# Patient Record
Sex: MALE | Race: ASIAN | NOT HISPANIC OR LATINO | URBAN - METROPOLITAN AREA
[De-identification: names, ages, dates, MRNs, and addresses within clinical notes are randomized per-mention and may not be internally consistent; named-entity substitution may affect disease eponyms.]

---

## 2018-12-14 ENCOUNTER — OFFICE VISIT (OUTPATIENT)
Dept: FAMILY MEDICINE | Facility: CLINIC | Age: 38
End: 2018-12-14
Payer: COMMERCIAL

## 2018-12-14 VITALS
SYSTOLIC BLOOD PRESSURE: 124 MMHG | OXYGEN SATURATION: 97 % | WEIGHT: 174 LBS | HEIGHT: 65 IN | RESPIRATION RATE: 20 BRPM | DIASTOLIC BLOOD PRESSURE: 79 MMHG | TEMPERATURE: 98.2 F | BODY MASS INDEX: 28.99 KG/M2 | HEART RATE: 83 BPM

## 2018-12-14 DIAGNOSIS — G47.9 SLEEP DISORDER: Primary | ICD-10-CM

## 2018-12-14 RX ORDER — ALBUTEROL SULFATE 90 UG/1
2 AEROSOL, METERED RESPIRATORY (INHALATION) EVERY 6 HOURS
COMMUNITY

## 2018-12-14 RX ORDER — LANOLIN ALCOHOL/MO/W.PET/CERES
3 CREAM (GRAM) TOPICAL
COMMUNITY
Start: 2018-12-14 | End: 2019-12-14

## 2018-12-14 RX ORDER — DIPHENHYDRAMINE HCL 25 MG
25 CAPSULE ORAL EVERY 6 HOURS PRN
COMMUNITY
Start: 2018-12-14 | End: 2019-12-14

## 2018-12-14 ASSESSMENT — MIFFLIN-ST. JEOR: SCORE: 1627.17

## 2018-12-14 ASSESSMENT — ANXIETY QUESTIONNAIRES
6. BECOMING EASILY ANNOYED OR IRRITABLE: NOT AT ALL
5. BEING SO RESTLESS THAT IT IS HARD TO SIT STILL: NOT AT ALL
GAD7 TOTAL SCORE: 2
7. FEELING AFRAID AS IF SOMETHING AWFUL MIGHT HAPPEN: NOT AT ALL
1. FEELING NERVOUS, ANXIOUS, OR ON EDGE: MORE THAN HALF THE DAYS
IF YOU CHECKED OFF ANY PROBLEMS ON THIS QUESTIONNAIRE, HOW DIFFICULT HAVE THESE PROBLEMS MADE IT FOR YOU TO DO YOUR WORK, TAKE CARE OF THINGS AT HOME, OR GET ALONG WITH OTHER PEOPLE: NOT DIFFICULT AT ALL
3. WORRYING TOO MUCH ABOUT DIFFERENT THINGS: NOT AT ALL
2. NOT BEING ABLE TO STOP OR CONTROL WORRYING: NOT AT ALL

## 2018-12-14 ASSESSMENT — PATIENT HEALTH QUESTIONNAIRE - PHQ9
SUM OF ALL RESPONSES TO PHQ QUESTIONS 1-9: 2
5. POOR APPETITE OR OVEREATING: NOT AT ALL

## 2018-12-14 NOTE — PATIENT INSTRUCTIONS
Improving Sleep Through Behavior Change                                                   Stimulus Control Procedures      Go to Bed Only When You Are Sleepy: The longer you are in bed, the more the bed is associated with a place to be awake instead of asleep. Delay bedtime until sleepy.     Get Out of Bed When You Can t Fall Asleep or Go Back to Sleep in About 15 Minutes: Get out of bed if you don t fall asleep fairly soon. Return to bed only when you are sleepy. When you feel sleepy, return to bed. The goal is to reconnect your bed with being asleep.    Use the Bed for Sleep and Sex Only: Do not watch TV, listen to the radio, eat, or read in your bed or bedroom.                                                       Sleep Hygiene Guidelines    Caffeine: Avoid caffeine 6 to 8 hours before bedtime. Caffeine disturbs sleep. Thus, drinking caffeinated beverages should be avoided near bedtime.      Nicotine: Avoid nicotine before bedtime. Nicotine can keep you awake. Avoid tobacco near bedtime and during the night.       Alcohol: Avoid alcohol after dinner. Alcohol often promotes the onset of sleep, but interrupts your natural sleep pattern. Do not consume it any closer than 4 hours before going to bed.       Sleeping Pills: Sleep medications are effective only temporarily. Sleep medications lose their effectiveness in about 2 to 4 weeks when taken regularly. Over time, sleeping pills actually can make sleep problems worse; withdrawal from the medication can lead to an insomnia rebound. Keep use of sleeping pills infrequent, but don t worry if you need to use one on an occasional basis.      Regular Exercise: Do not exercise within 2 hours of bedtime as it may elevate nervous system activity and interfere with your ability to fall asleep       Bedroom Environment: Your bedroom should have a moderate temperature and be quiet and dark. Noises can be masked with background white noise  (e.g., the noise of a fan) or with earplugs. Bedrooms may be darkened with blackout shades, or sleep masks can be worn.       Eating: A light bedtime snack, such a glass of warm milk, cheese, or a bowl of cereal can promote sleep. Avoid snacks in the middle of the night because awakening may become associated with hunger.       Avoid Naps: The sleep you obtain during the day takes away from the amount of sleep you need that night. If you must nap, schedule it before 3:00 p.m. Don t sleep more than 15 to 30 minutes.      Allow Yourself at Least an Hour Before Bedtime to Unwind: Find what works for you to wind down, and perhaps give yourself an hour to do so.       Regular Sleep Schedule: Keep a regular time each day, 7 days a week, to get out of bed. Keeping a regular waking time helps set your circadian rhythm so that your body learns to sleep at the desired time.      Set a Reasonable Bedtime and Arising Time and Stick to Them: Set the alarm clock and get out of bed at the same time each morning, weekdays and weekends, regardless of your bedtime or the amount of sleep you obtained on the previous night. This guideline is designed to regulate your internal biological clock and reset your sleep-wake rhythm.    DURGA Clifton., Tay, ALLEN FENTON., Samanthat, M. S., & HUMZA Mabry (2009). Integrated Behavioral [place] in Primary Care: Step-by-Step Guidance for Assessment and Intervention. [employer].     Melatonin - Take 1.5-3 mg 2 hours before you want to go to sleep  Benadryl - Take 25-50 mg or 1 - 2 capsules 30 - 45 min before bed

## 2018-12-14 NOTE — PROGRESS NOTES
Preceptor Attestation:   Patient seen, evaluated and discussed with the resident. I have verified the content of the note, which accurately reflects my assessment of the patient and the plan of care.   Supervising Physician:  Srini Burnett MD

## 2018-12-15 ASSESSMENT — ANXIETY QUESTIONNAIRES: GAD7 TOTAL SCORE: 2

## 2022-09-14 ENCOUNTER — TRANSFERRED RECORDS (OUTPATIENT)
Dept: HEALTH INFORMATION MANAGEMENT | Facility: CLINIC | Age: 42
End: 2022-09-14

## 2022-12-06 ENCOUNTER — ANCILLARY PROCEDURE (OUTPATIENT)
Dept: GENERAL RADIOLOGY | Facility: CLINIC | Age: 42
End: 2022-12-06
Attending: STUDENT IN AN ORGANIZED HEALTH CARE EDUCATION/TRAINING PROGRAM
Payer: COMMERCIAL

## 2022-12-06 ENCOUNTER — OFFICE VISIT (OUTPATIENT)
Dept: FAMILY MEDICINE | Facility: CLINIC | Age: 42
End: 2022-12-06
Payer: COMMERCIAL

## 2022-12-06 VITALS
SYSTOLIC BLOOD PRESSURE: 139 MMHG | HEART RATE: 72 BPM | RESPIRATION RATE: 18 BRPM | OXYGEN SATURATION: 100 % | DIASTOLIC BLOOD PRESSURE: 90 MMHG | WEIGHT: 176 LBS | BODY MASS INDEX: 29.32 KG/M2 | TEMPERATURE: 98.3 F | HEIGHT: 65 IN

## 2022-12-06 DIAGNOSIS — R52 PAIN: ICD-10-CM

## 2022-12-06 DIAGNOSIS — Z13.220 SCREENING FOR HYPERLIPIDEMIA: Primary | ICD-10-CM

## 2022-12-06 DIAGNOSIS — S93.402A SPRAIN OF LEFT ANKLE, UNSPECIFIED LIGAMENT, INITIAL ENCOUNTER: ICD-10-CM

## 2022-12-06 PROCEDURE — 73620 X-RAY EXAM OF FOOT: CPT | Mod: TC | Performed by: RADIOLOGY

## 2022-12-06 PROCEDURE — 99203 OFFICE O/P NEW LOW 30 MIN: CPT | Performed by: STUDENT IN AN ORGANIZED HEALTH CARE EDUCATION/TRAINING PROGRAM

## 2022-12-06 PROCEDURE — 73600 X-RAY EXAM OF ANKLE: CPT | Mod: TC | Performed by: RADIOLOGY

## 2022-12-06 RX ORDER — INDOMETHACIN 50 MG/1
50 CAPSULE ORAL 2 TIMES DAILY WITH MEALS
COMMUNITY

## 2022-12-06 NOTE — PROGRESS NOTES
Chief Complaint   Patient presents with     Ankle Sprain     Left ankle/foot sprain since last Thursday       There are no exam notes on file for this visit.        Assessment/Plan:  Kong Borja is a 42 year old male here for L foot and ankle pain with bruising, with persistent swelling, bruising and difficulty walking for the past 5 days.  Met [place] ankle criteria so X-ray imaging obtained to r/o fracture and no fracture seen on imaging. Patient instructed in RICE therapy, symptomatic cares, ankle stretching and strengthening and given a note for return to work.   - F/u for annual wellness exam  - F/u for ankle PRN    Kong was seen today for ankle sprain.    Diagnoses and all orders for this visit:    Screening for hyperlipidemia    Pain  -     XR Foot Left 2 Views; Future  -     XR Ankle Left 2 Views; Future    Sprain of left ankle, unspecified ligament, initial encounter        Follow-up with Behm, Benita Kay in 4 weeks for annual physical.    No future appointments.      Mindi Cary MD      There are no Patient Instructions on file for this visit.    Subjective:  Kong Borja is a 42 year old male here for left foot pain after twisting foot/awkward fall from treadmill. Had difficulty walking at the time of the accident and noted substantial bruising/swelling over the left foot and ankle. With pain over the dorsum of the left foot and on the medial aspect of the left malleolus. Able to limp today.     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     Patient Active Problem List   Diagnosis     Health Care Home       Current Outpatient Medications   Medication     albuterol (PROAIR HFA) 108 (90 BASE) MCG/ACT inhaler     indomethacin (INDOCIN) 50 MG capsule     albuterol (PROAIR HFA/PROVENTIL HFA/VENTOLIN HFA) 108 (90 Base) MCG/ACT inhaler     atovaquone-proguanil (MALARONE) 250-100 MG per tablet     loperamide (IMODIUM A-D) 2 MG tablet     naproxen (NAPROSYN)  "500 MG tablet     No current facility-administered medications for this visit.       Objective:  BP (!) 139/90 (BP Location: Right arm, Patient Position: Sitting, Cuff Size: Adult Regular)   Pulse 72   Temp 98.3  F (36.8  C) (Tympanic)   Resp 18   Ht 1.651 m (5' 5\")   Wt 79.8 kg (176 lb)   SpO2 100%   BMI 29.29 kg/m    Body mass index is 29.29 kg/m .  Gen: A/O x3, in NAD.  Ext: LLE with marked bruising and edema of the dorsum of the left foot just proximal to the MTP joints of the second-fourth toes. Also with bruising and edema approx 6cm proximal to the fibula and some tenderness to palpation of the posterior distal aspect of the medial malleolus. Negative squeeze test over base of 5th metatarsal but with pain with forefoot squeeze.   Neuro: Grossly intact.    XR L foot and ankle negative for fracture.     "

## 2022-12-06 NOTE — LETTER
RETURN TO WORK/SCHOOL FORM    12/6/2022    Re: Kong Borja  1980      To Whom It May Concern:     Kong Borja was seen in clinic today..  He may return to work without restrictions on 12/7/22.  However, he may have some persistent pain and require shortened hours or to have work that can be done in stationary positioning if pain is worsening.  This would be expected to persist for the next 2-3 weeks.     Please contact me with any questions or concerns.     Sincerely,        Mindi Cary MD  12/6/2022 3:33 PM

## 2023-03-16 ENCOUNTER — TRANSFERRED RECORDS (OUTPATIENT)
Dept: HEALTH INFORMATION MANAGEMENT | Facility: CLINIC | Age: 43
End: 2023-03-16
Payer: COMMERCIAL

## 2024-04-18 ENCOUNTER — TRANSFERRED RECORDS (OUTPATIENT)
Dept: HEALTH INFORMATION MANAGEMENT | Facility: CLINIC | Age: 44
End: 2024-04-18
Payer: COMMERCIAL